# Patient Record
Sex: FEMALE | Race: WHITE | NOT HISPANIC OR LATINO | URBAN - METROPOLITAN AREA
[De-identification: names, ages, dates, MRNs, and addresses within clinical notes are randomized per-mention and may not be internally consistent; named-entity substitution may affect disease eponyms.]

---

## 2017-05-10 ENCOUNTER — OFFICE (OUTPATIENT)
Dept: URBAN - METROPOLITAN AREA CLINIC 95 | Facility: CLINIC | Age: 52
End: 2017-05-10

## 2017-05-10 VITALS
DIASTOLIC BLOOD PRESSURE: 62 MMHG | TEMPERATURE: 97.5 F | HEIGHT: 67 IN | HEART RATE: 76 BPM | SYSTOLIC BLOOD PRESSURE: 92 MMHG | WEIGHT: 147 LBS

## 2017-05-10 DIAGNOSIS — R10.13 EPIGASTRIC PAIN: ICD-10-CM

## 2017-05-10 DIAGNOSIS — R14.0 ABDOMINAL DISTENSION (GASEOUS): ICD-10-CM

## 2017-05-10 DIAGNOSIS — R11.0 NAUSEA: ICD-10-CM

## 2017-05-10 PROCEDURE — 99214 OFFICE O/P EST MOD 30 MIN: CPT

## 2017-05-10 NOTE — SERVICEHPINOTES
Ms. Arenas is here to discuss "gas, bloating, and stomach issues". Symptoms began in January. She sought ER care on 1.30.17 and had a CT scan w/o contrast which showed a mildly borderline dilated gallbladder. CBC and CMP was unremarkable. Iron, TIBC, ferritin was normal. She was found to have kidney stones and a UTI, saw urology who did not find anything wrong.    With any oral intake, she is in bed from pain and nausea. Pain usually doesn't last long but nausea does. She will wake up in the middle of the night from symptoms. Her mother, sister, two aunts, and maternal grandmother all have gallbladder disease. Pain doesn't radiate anywhere. Sometimes acidic foods provoke symptoms too such as coffee and mints. She used to take Aleve often two yrs ago, but none since then. No hematemesis, dark stool, or coffee ground emesis. No actual vomiting. No dysphagia or anorexia. No weight loss.

## 2017-06-01 ENCOUNTER — OFFICE (OUTPATIENT)
Dept: URBAN - METROPOLITAN AREA CLINIC 32 | Facility: CLINIC | Age: 52
End: 2017-06-01

## 2017-06-01 ENCOUNTER — INDEPENDENT LABORATORY (OUTPATIENT)
Dept: URBAN - METROPOLITAN AREA PATHOLOGY 17 | Facility: PATHOLOGY | Age: 52
End: 2017-06-01

## 2017-06-01 VITALS
TEMPERATURE: 97.7 F | TEMPERATURE: 97.9 F | DIASTOLIC BLOOD PRESSURE: 70 MMHG | HEART RATE: 92 BPM | SYSTOLIC BLOOD PRESSURE: 97 MMHG | SYSTOLIC BLOOD PRESSURE: 101 MMHG | WEIGHT: 147 LBS | DIASTOLIC BLOOD PRESSURE: 61 MMHG | OXYGEN SATURATION: 96 % | OXYGEN SATURATION: 100 % | RESPIRATION RATE: 14 BRPM | HEIGHT: 67 IN | HEART RATE: 65 BPM | RESPIRATION RATE: 16 BRPM

## 2017-06-01 DIAGNOSIS — R10.13 EPIGASTRIC PAIN: ICD-10-CM

## 2017-06-01 DIAGNOSIS — R11.0 NAUSEA: ICD-10-CM

## 2017-06-01 DIAGNOSIS — K29.70 GASTRITIS, UNSPECIFIED, WITHOUT BLEEDING: ICD-10-CM

## 2017-06-01 DIAGNOSIS — R14.0 ABDOMINAL DISTENSION (GASEOUS): ICD-10-CM

## 2017-06-01 PROCEDURE — 00740: CPT | Mod: AA,QS

## 2017-06-01 PROCEDURE — 88305 TISSUE EXAM BY PATHOLOGIST: CPT

## 2017-06-01 PROCEDURE — 88313 SPECIAL STAINS GROUP 2: CPT

## 2017-06-01 PROCEDURE — 88342 IMHCHEM/IMCYTCHM 1ST ANTB: CPT

## 2017-07-19 ENCOUNTER — OFFICE (OUTPATIENT)
Dept: URBAN - METROPOLITAN AREA CLINIC 95 | Facility: CLINIC | Age: 52
End: 2017-07-19

## 2017-07-19 VITALS
SYSTOLIC BLOOD PRESSURE: 111 MMHG | DIASTOLIC BLOOD PRESSURE: 68 MMHG | HEART RATE: 75 BPM | TEMPERATURE: 97.2 F | HEIGHT: 67 IN | WEIGHT: 143 LBS

## 2017-07-19 DIAGNOSIS — R10.31 RIGHT LOWER QUADRANT PAIN: ICD-10-CM

## 2017-07-19 DIAGNOSIS — R11.0 NAUSEA: ICD-10-CM

## 2017-07-19 DIAGNOSIS — K29.70 GASTRITIS, UNSPECIFIED, WITHOUT BLEEDING: ICD-10-CM

## 2017-07-19 DIAGNOSIS — R10.13 EPIGASTRIC PAIN: ICD-10-CM

## 2017-07-19 PROCEDURE — 99214 OFFICE O/P EST MOD 30 MIN: CPT

## 2017-07-19 NOTE — SERVICEHPINOTES
Ms. Arenas is here for follow up regarding gas, bloating and stomach issues.   Symptoms began in January. She sought ER care on 1.30.17 and had a CT scan w/o contrast which showed a mildly borderline dilated gallbladder. CBC and CMP was unremarkable. Iron, TIBC, ferritin was normal. She was found to have kidney stones and a UTI, saw urology who did not find anything wrong Symptoms include nausea and abdominal pain with oral intake.Evaluation has included: EGD which showed gastritis, HIDA scan with CCK is negative, normal GI empty scan. US is normal as is celiac panel and TSH.  Currently, she is doing a little bit better with abdominal pain. She inquires if stress has anything to do with this. More nausea when she is stressed. A few lbs of weight loss since last OV. Some recent headaches but no vision changes. She tends to get RLQ pain with eating certain foods i.e. watermelon. When she gets abdominal pain she has to go lay down. She has not tried the Pantoprazole for more than a few days. She also is interested in trying an antispasmodic and the low FODMAP diet. BR

## 2017-12-07 ENCOUNTER — OFFICE (OUTPATIENT)
Dept: URBAN - METROPOLITAN AREA CLINIC 101 | Facility: CLINIC | Age: 52
End: 2017-12-07

## 2017-12-07 VITALS
HEIGHT: 67 IN | HEART RATE: 69 BPM | TEMPERATURE: 97.9 F | SYSTOLIC BLOOD PRESSURE: 106 MMHG | WEIGHT: 149 LBS | DIASTOLIC BLOOD PRESSURE: 71 MMHG

## 2017-12-07 DIAGNOSIS — K62.5 HEMORRHAGE OF ANUS AND RECTUM: ICD-10-CM

## 2017-12-07 DIAGNOSIS — Z80.0 FAMILY HISTORY OF MALIGNANT NEOPLASM OF DIGESTIVE ORGANS: ICD-10-CM

## 2017-12-07 DIAGNOSIS — R14.0 ABDOMINAL DISTENSION (GASEOUS): ICD-10-CM

## 2017-12-07 DIAGNOSIS — R11.0 NAUSEA: ICD-10-CM

## 2017-12-07 PROCEDURE — 99214 OFFICE O/P EST MOD 30 MIN: CPT

## 2017-12-07 RX ORDER — PANTOPRAZOLE SODIUM 40 MG/1
TABLET, DELAYED RELEASE ORAL
Qty: 30 | Refills: 11 | Status: COMPLETED
End: 2021-05-17

## 2017-12-07 NOTE — SERVICEHPINOTES
MAKSIM QUIJANO   is a   52  female who complains of rectal bleeding and abdominal pain. She had one episode of blood when wiping after BM last week. She was concerned as her mother had colon cancer. Her last colonoscopy was 6/2016 and was normal. She does have a hx of polyps and has been getting colonoscopies every 5 years since age 34. She denies any change in bowels, BMs once every other day, BSS type ranges 7 1-4. She has had an extensive workup for abdominal pain in the past and was diagnosed with probable IBS. She experiences frequent bloating and diffuse abdominal pain, often after eating. She tries to follow the low FODMAP diet but isn't always compliant. She takes a probiotic, sometimes forgets a dose, but does notice an improvement in symptoms when she takes it reguarly. She als has a h/o gastritis on EGD, she was taking pantoprazole 40mg daily but has recently only been taking it prn. She drinks 2 cups of coffee/day. She admits to occasional nausea, no vomiting. She reports dark stool, but she take Vitron-c 3-4x/week. She denies dysphagia, weight loss, fever, or chills.BR

## 2017-12-15 ENCOUNTER — OFFICE (OUTPATIENT)
Dept: URBAN - METROPOLITAN AREA CLINIC 33 | Facility: CLINIC | Age: 52
End: 2017-12-15

## 2017-12-15 DIAGNOSIS — R12 HEARTBURN: ICD-10-CM

## 2017-12-15 DIAGNOSIS — K62.5 HEMORRHAGE OF ANUS AND RECTUM: ICD-10-CM

## 2017-12-15 DIAGNOSIS — R10.84 GENERALIZED ABDOMINAL PAIN: ICD-10-CM

## 2017-12-15 DIAGNOSIS — R19.7 DIARRHEA, UNSPECIFIED: ICD-10-CM

## 2017-12-15 DIAGNOSIS — K59.09 OTHER CONSTIPATION: ICD-10-CM

## 2017-12-15 PROCEDURE — 91065 BREATH HYDROGEN/METHANE TEST: CPT

## 2021-05-17 ENCOUNTER — OFFICE (OUTPATIENT)
Dept: URBAN - METROPOLITAN AREA CLINIC 34 | Facility: CLINIC | Age: 56
End: 2021-05-17

## 2021-05-17 PROCEDURE — 00038: CPT | Performed by: INTERNAL MEDICINE

## 2021-08-19 ENCOUNTER — OFFICE (OUTPATIENT)
Dept: URBAN - METROPOLITAN AREA CLINIC 34 | Facility: CLINIC | Age: 56
End: 2021-08-19

## 2021-08-19 DIAGNOSIS — Z11.59 ENCOUNTER FOR SCREENING FOR OTHER VIRAL DISEASES: ICD-10-CM

## 2021-08-19 PROCEDURE — 99211 OFF/OP EST MAY X REQ PHY/QHP: CPT | Mod: CS,25 | Performed by: INTERNAL MEDICINE

## 2021-08-19 PROCEDURE — 99211 OFF/OP EST MAY X REQ PHY/QHP: CPT | Mod: 25,CS | Performed by: INTERNAL MEDICINE

## 2021-08-23 ENCOUNTER — OFFICE (OUTPATIENT)
Dept: URBAN - METROPOLITAN AREA CLINIC 30 | Facility: CLINIC | Age: 56
End: 2021-08-23
Payer: COMMERCIAL

## 2021-08-23 VITALS
HEART RATE: 75 BPM | RESPIRATION RATE: 22 BRPM | SYSTOLIC BLOOD PRESSURE: 94 MMHG | OXYGEN SATURATION: 100 % | RESPIRATION RATE: 17 BRPM | RESPIRATION RATE: 23 BRPM | RESPIRATION RATE: 16 BRPM | WEIGHT: 139 LBS | SYSTOLIC BLOOD PRESSURE: 92 MMHG | OXYGEN SATURATION: 99 % | HEART RATE: 78 BPM | SYSTOLIC BLOOD PRESSURE: 90 MMHG | DIASTOLIC BLOOD PRESSURE: 59 MMHG | TEMPERATURE: 96.8 F | SYSTOLIC BLOOD PRESSURE: 85 MMHG | HEART RATE: 73 BPM | RESPIRATION RATE: 15 BRPM | HEART RATE: 71 BPM | HEART RATE: 69 BPM | DIASTOLIC BLOOD PRESSURE: 54 MMHG | HEART RATE: 62 BPM | DIASTOLIC BLOOD PRESSURE: 69 MMHG | DIASTOLIC BLOOD PRESSURE: 57 MMHG | SYSTOLIC BLOOD PRESSURE: 104 MMHG | TEMPERATURE: 9.9 F | DIASTOLIC BLOOD PRESSURE: 51 MMHG | HEIGHT: 67 IN

## 2021-08-23 DIAGNOSIS — Z80.0 FAMILY HISTORY OF MALIGNANT NEOPLASM OF DIGESTIVE ORGANS: ICD-10-CM

## 2021-08-23 DIAGNOSIS — Z12.11 ENCOUNTER FOR SCREENING FOR MALIGNANT NEOPLASM OF COLON: ICD-10-CM
